# Patient Record
Sex: MALE | Race: BLACK OR AFRICAN AMERICAN | NOT HISPANIC OR LATINO | URBAN - METROPOLITAN AREA
[De-identification: names, ages, dates, MRNs, and addresses within clinical notes are randomized per-mention and may not be internally consistent; named-entity substitution may affect disease eponyms.]

---

## 2017-08-29 ENCOUNTER — APPOINTMENT (RX ONLY)
Dept: URBAN - METROPOLITAN AREA OTHER 13 | Facility: OTHER | Age: 57
Setting detail: DERMATOLOGY
End: 2017-08-29

## 2017-08-29 DIAGNOSIS — L30.9 DERMATITIS, UNSPECIFIED: ICD-10-CM | Status: RESOLVED

## 2017-08-29 DIAGNOSIS — A63.0 ANOGENITAL (VENEREAL) WARTS: ICD-10-CM | Status: INADEQUATELY CONTROLLED

## 2017-08-29 PROBLEM — E13.9 OTHER SPECIFIED DIABETES MELLITUS WITHOUT COMPLICATIONS: Status: ACTIVE | Noted: 2017-08-29

## 2017-08-29 PROBLEM — F32.9 MAJOR DEPRESSIVE DISORDER, SINGLE EPISODE, UNSPECIFIED: Status: ACTIVE | Noted: 2017-08-29

## 2017-08-29 PROBLEM — M12.9 ARTHROPATHY, UNSPECIFIED: Status: ACTIVE | Noted: 2017-08-29

## 2017-08-29 PROCEDURE — ? TREATMENT REGIMEN

## 2017-08-29 PROCEDURE — ? PRESCRIPTION

## 2017-08-29 PROCEDURE — ? COUNSELING

## 2017-08-29 PROCEDURE — 99214 OFFICE O/P EST MOD 30 MIN: CPT

## 2017-08-29 RX ORDER — IMIQUIMOD 5 %
CREAM IN PACKET (EA) TOPICAL TIW
Qty: 1 | Refills: 2 | Status: ERX | COMMUNITY
Start: 2017-08-29

## 2017-08-29 RX ADMIN — Medication: at 17:32

## 2017-08-29 ASSESSMENT — LOCATION SIMPLE DESCRIPTION DERM
LOCATION SIMPLE: RIGHT AXILLARY VAULT
LOCATION SIMPLE: PENIS
LOCATION SIMPLE: LEFT AXILLARY VAULT

## 2017-08-29 ASSESSMENT — LOCATION DETAILED DESCRIPTION DERM
LOCATION DETAILED: LEFT DORSAL SHAFT OF PENIS
LOCATION DETAILED: LEFT AXILLARY VAULT
LOCATION DETAILED: RIGHT AXILLARY VAULT

## 2017-08-29 ASSESSMENT — LOCATION ZONE DERM
LOCATION ZONE: PENIS
LOCATION ZONE: AXILLAE

## 2017-08-29 NOTE — PROCEDURE: TREATMENT REGIMEN
Discontinue Regimen: Topical steroids
Detail Level: Detailed
Initiate Treatment: Aldara to the areas around the shaft to start 3 times weekly at night. Ok to skip a night or 2 & even calm it down with triamcinolone if needed but not using the clotrimazole betamethasone.  Would not treat all lesions at once. Concern for meatus inflammation when that area is treated. Hoping that treating more peripheral lesions first will help to reduce the size or resolve the more distal lesions. Will keep close fu to check progress, possible biopsy if not resolved at follow up if indicated and/or change therapy based on response.
Plan: Return to office in 1 month

## 2017-09-29 ENCOUNTER — APPOINTMENT (RX ONLY)
Dept: URBAN - METROPOLITAN AREA OTHER 13 | Facility: OTHER | Age: 57
Setting detail: DERMATOLOGY
End: 2017-09-29

## 2017-09-29 DIAGNOSIS — A63.0 ANOGENITAL (VENEREAL) WARTS: ICD-10-CM | Status: IMPROVED

## 2017-09-29 PROCEDURE — ? COUNSELING

## 2017-09-29 PROCEDURE — ? TREATMENT REGIMEN

## 2017-09-29 PROCEDURE — 99212 OFFICE O/P EST SF 10 MIN: CPT

## 2017-09-29 ASSESSMENT — LOCATION ZONE DERM: LOCATION ZONE: PENIS

## 2017-09-29 ASSESSMENT — LOCATION DETAILED DESCRIPTION DERM: LOCATION DETAILED: DORSAL GLANS

## 2017-09-29 ASSESSMENT — LOCATION SIMPLE DESCRIPTION DERM: LOCATION SIMPLE: PENIS

## 2017-09-29 NOTE — PROCEDURE: TREATMENT REGIMEN
Modify Regimen: May need to decrease application of aldara to twice weekly for the distal penis due to foreskin
Detail Level: Detailed